# Patient Record
Sex: FEMALE | URBAN - NONMETROPOLITAN AREA
[De-identification: names, ages, dates, MRNs, and addresses within clinical notes are randomized per-mention and may not be internally consistent; named-entity substitution may affect disease eponyms.]

---

## 2018-02-21 ENCOUNTER — APPOINTMENT (OUTPATIENT)
Age: 58
Setting detail: DERMATOLOGY
End: 2018-02-21

## 2018-02-21 DIAGNOSIS — L40.0 PSORIASIS VULGARIS: ICD-10-CM

## 2018-02-21 DIAGNOSIS — L82.0 INFLAMED SEBORRHEIC KERATOSIS: ICD-10-CM

## 2018-02-21 DIAGNOSIS — L29.8 OTHER PRURITUS: ICD-10-CM

## 2018-02-21 PROBLEM — L30.9 DERMATITIS, UNSPECIFIED: Status: ACTIVE | Noted: 2018-02-21

## 2018-02-21 PROCEDURE — 99203 OFFICE O/P NEW LOW 30 MIN: CPT | Mod: 25

## 2018-02-21 PROCEDURE — OTHER COUNSELING: OTHER

## 2018-02-21 PROCEDURE — OTHER REASSURANCE: OTHER

## 2018-02-21 PROCEDURE — 11100: CPT

## 2018-02-21 PROCEDURE — 11101: CPT

## 2018-02-21 PROCEDURE — OTHER ORDER TESTS: OTHER

## 2018-02-21 PROCEDURE — OTHER PHOTOTHERAPY TREATMENT: OTHER

## 2018-02-21 PROCEDURE — 36415 COLL VENOUS BLD VENIPUNCTURE: CPT

## 2018-02-21 PROCEDURE — 96900 ACTINOTHERAPY UV LIGHT: CPT

## 2018-02-21 PROCEDURE — OTHER VENIPUNCTURE: OTHER

## 2018-02-21 PROCEDURE — OTHER PRESCRIPTION: OTHER

## 2018-02-21 PROCEDURE — OTHER BIOPSY BY PUNCH METHOD: OTHER

## 2018-02-21 RX ORDER — CALCIPOTRIENE 50 UG/G
OINTMENT TOPICAL
Qty: 1 | Refills: 1 | Status: ERX | COMMUNITY
Start: 2018-02-21

## 2018-02-21 RX ORDER — HYDROXYZINE HYDROCHLORIDE 25 MG/1
TABLET, FILM COATED ORAL
Qty: 30 | Refills: 1 | Status: ERX | COMMUNITY
Start: 2018-02-21

## 2018-02-21 RX ORDER — KETOCONAZOLE 20.5 MG/ML
SHAMPOO, SUSPENSION TOPICAL
Qty: 1 | Refills: 1 | Status: ERX | COMMUNITY
Start: 2018-02-21

## 2018-02-21 RX ORDER — TRIAMCINOLONE ACETONIDE 1 MG/G
CREAM TOPICAL
Qty: 1 | Refills: 1 | Status: ERX | COMMUNITY
Start: 2018-02-21

## 2018-02-21 RX ORDER — CLOBETASOL PROPIONATE 0.5 MG/ML
SOLUTION TOPICAL
Qty: 2 | Refills: 1 | Status: ERX | COMMUNITY
Start: 2018-02-21

## 2018-02-21 ASSESSMENT — LOCATION SIMPLE DESCRIPTION DERM
LOCATION SIMPLE: UPPER BACK
LOCATION SIMPLE: LEFT POSTERIOR THIGH
LOCATION SIMPLE: LEFT UPPER ARM
LOCATION SIMPLE: RIGHT ELBOW
LOCATION SIMPLE: RIGHT BUTTOCK
LOCATION SIMPLE: RIGHT POSTERIOR THIGH
LOCATION SIMPLE: LEFT BUTTOCK
LOCATION SIMPLE: CHEST
LOCATION SIMPLE: POSTERIOR SCALP
LOCATION SIMPLE: ABDOMEN
LOCATION SIMPLE: LEFT FOREARM
LOCATION SIMPLE: LEFT ELBOW
LOCATION SIMPLE: RIGHT FOREARM
LOCATION SIMPLE: RIGHT UPPER ARM

## 2018-02-21 ASSESSMENT — LOCATION DETAILED DESCRIPTION DERM
LOCATION DETAILED: LEFT RIB CAGE
LOCATION DETAILED: LEFT ELBOW
LOCATION DETAILED: RIGHT SUPERIOR OCCIPITAL SCALP
LOCATION DETAILED: INFERIOR THORACIC SPINE
LOCATION DETAILED: RIGHT RIB CAGE
LOCATION DETAILED: RIGHT ELBOW
LOCATION DETAILED: RIGHT PROXIMAL DORSAL FOREARM
LOCATION DETAILED: MID-OCCIPITAL SCALP
LOCATION DETAILED: RIGHT LATERAL ABDOMEN
LOCATION DETAILED: RIGHT VENTRAL DISTAL FOREARM
LOCATION DETAILED: LEFT ANTERIOR PROXIMAL UPPER ARM
LOCATION DETAILED: LEFT BUTTOCK
LOCATION DETAILED: LEFT PROXIMAL POSTERIOR THIGH
LOCATION DETAILED: RIGHT POSTERIOR PARIETAL SCALP
LOCATION DETAILED: RIGHT LATERAL SUPERIOR CHEST
LOCATION DETAILED: MIDDLE STERNUM
LOCATION DETAILED: LEFT SUPERIOR OCCIPITAL SCALP
LOCATION DETAILED: LEFT ANTECUBITAL SKIN
LOCATION DETAILED: RIGHT BUTTOCK
LOCATION DETAILED: LEFT LATERAL ABDOMEN
LOCATION DETAILED: LEFT LATERAL SUPERIOR CHEST
LOCATION DETAILED: RIGHT PROXIMAL POSTERIOR THIGH
LOCATION DETAILED: LEFT PROXIMAL DORSAL FOREARM
LOCATION DETAILED: RIGHT VENTRAL PROXIMAL FOREARM
LOCATION DETAILED: RIGHT ANTERIOR DISTAL UPPER ARM
LOCATION DETAILED: RIGHT MEDIAL BUTTOCK
LOCATION DETAILED: LEFT VENTRAL DISTAL FOREARM

## 2018-02-21 ASSESSMENT — LOCATION ZONE DERM
LOCATION ZONE: LEG
LOCATION ZONE: TRUNK
LOCATION ZONE: SCALP
LOCATION ZONE: ARM

## 2018-02-21 NOTE — PROCEDURE: BIOPSY BY PUNCH METHOD
Punch Size In Mm: 3
Bill [id number] For Specimen Handling/Conveyance To Laboratory?: no
Anesthesia Volume In Cc (Will Not Render If 0): 0.5
X Depth Of Punch In Cm (Optional): 0
Post-Care Instructions: I reviewed with the patient in detail post-care instructions. Patient is to keep the biopsy site dry overnight, and then wash with hydrogen peroxide 50/50 mix or antibacterial soap and water twice daily and apply bacitracin twice daily healed. Patient may apply hydrogen peroxide soaks to remove any crusting.
Hemostasis: None
Anesthesia Type: 1% lidocaine with epinephrine and a 1:10 solution of 8.4% sodium bicarbonate
Biopsy Type: H and E
Detail Level: Detailed
Epidermal Sutures: 4-0 Nylon
Wound Care: Bacitracin
Suture Removal: 14 days
Consent: Written consent was obtained and risks were reviewed including but not limited to scarring, infection, bleeding, scabbing, incomplete removal, nerve damage and allergy to anesthesia.
Dressing: bandage
Home Suture Removal Text: Patient was provided a home suture removal kit and will remove their sutures at home.  If they have any questions or difficulties they will call the office.
Notification Instructions: Patient will be notified of biopsy results. However, patient instructed to call the office if not contacted within 2 weeks.
Billing Type: Third-Party Bill

## 2018-02-21 NOTE — PROCEDURE: VENIPUNCTURE
Number Of Tubes Drawn: 6
Detail Level: None
Venipuncture Paragraph: An alcohol pad was applied to the venipuncture site. Venipuncture was performed using a butterfly needle. Pressure and a bandaid was applied to the site. No complications were noted.

## 2018-02-21 NOTE — PROCEDURE: PHOTOTHERAPY TREATMENT
Consent: Written consent obtained.  The risks were reviewed with the patient including but not limited to: burn, pigmentary changes, pain, blistering, scabbing, redness, increased risk of skin cancers, and the remote possibility of scarring.
Protocol For Photochemotherapy: Petrolatum And Nbuvb: The patient received Photochemotherapy: Petrolatum and NBUVB (petrolatum applied to all lesions prior to phototherapy).
Protocol For Protocol For Photochemotherapy For Severe Photoresponsive Dermatoses: Bath Puva: The patient received Photochemotherapy for severe photoresponsive dermatoses: Bath PUVA requiring at least 4 to 8 hours of care under direct physician supervision.
Protocol For Photochemotherapy For Severe Photoresponsive Dermatoses: Puva: The patient received Photochemotherapy for severe photoresponsive dermatoses: PUVA requiring at least 4 to 8 hours of care under direct physician supervision.
Protocol: NBUVB
Protocol For Bath Puva: The patient received Bath PUVA.
Protocol For Photochemotherapy For Severe Photoresponsive Dermatoses: Petrolatum And Broad Band Uvb: The patient received Photochemotherapyfor severe photoresponsive dermatoses: Petrolatum and Broad Band UVB requiring at least 4 to 8 hours of care under direct physician supervision.
Detail Level: Zone
Protocol For Uva1: The patient received UVA1.
Protocol For Photochemotherapy For Severe Photoresponsive Dermatoses: Tar And Nbuvb (Goeckerman Treatment): The patient received Photochemotherapy for severe photoresponsive dermatoses: Tar and NBUVB (Goeckerman treatment) requiring at least 4 to 8 hours of care under direct physician supervision.
Protocol For Uva: The patient received UVA.
Treatment Number: 1
Protocol For Nbuvb: The patient received NBUVB.
Protocol For Photochemotherapy: Tar And Broad Band Uvb (Goeckerman Treatment): The patient received Photochemotherapy: Tar and Broad Band UVB (Goeckerman treatment).
Post-Care Instructions: I reviewed with the patient in detail post-care instructions. Patient is to wear sun protection. Patients may expect sunburn like redness, discomfort and scabbing.
Protocol For Photochemotherapy: Mineral Oil And Nbuvb: The patient received Photochemotherapy: Mineral Oil and NBUVB (mineral oil applied to all lesions prior to phototherapy).
Protocol For Photochemotherapy For Severe Photoresponsive Dermatoses: Petrolatum And Nbuvb: The patient received Photochemotherapy for severe photoresponsive dermatoses: Petrolatum and NBUVB requiring at least 4 to 8 hours of care under direct physician supervision.
Protocol For Puva: The patient received PUVA.
Protocol For Photochemotherapy: Tar And Nbuvb (Goeckerman Treatment): The patient received Photochemotherapy: Tar and NBUVB (Goeckerman treatment).
Protocol For Photochemotherapy: Mineral Oil And Broad Band Uvb: The patient received Photochemotherapy: Mineral Oil and Broad Band UVB.
Protocol For Broad Band Uvb: The patient received Broad Band UVB.
Protocol For Nb Uva: The patient received NB UVA.
Protocol For Photochemotherapy: Triamcinolone Ointment And Nbuvb: The patient received Photochemotherapy: Triamcinolone and NBUVB (triamcinolone ointment applied to all lesions prior to phototherapy).
Protocol For Photochemotherapy For Severe Photoresponsive Dermatoses: Tar And Broad Band Uvb (Goeckerman Treatment): The patient received Photochemotherapy for severe photoresponsive dermatoses: Tar and Broad Band UVB (Goeckerman treatment) requiring at least 4 to 8 hours of care under direct physician supervision.
Protocol For Photochemotherapy: Petrolatum And Broad Band Uvb: The patient received Photochemotherapy: Petrolatum and Broad Band UVB.
Protocol For Photochemotherapy: Baby Oil And Nbuvb: The patient received Photochemotherapy: Baby Oil and NBUVB (baby oil applied to all lesions prior to phototherapy).

## 2018-02-23 ENCOUNTER — APPOINTMENT (OUTPATIENT)
Age: 58
Setting detail: DERMATOLOGY
End: 2018-02-23

## 2018-02-23 DIAGNOSIS — L40.0 PSORIASIS VULGARIS: ICD-10-CM

## 2018-02-23 PROCEDURE — OTHER PHOTOTHERAPY TREATMENT: OTHER

## 2018-02-23 PROCEDURE — 96910 PHOTCHMTX TAR&UVB/PTRLTM&UVB: CPT

## 2018-02-23 ASSESSMENT — LOCATION DETAILED DESCRIPTION DERM: LOCATION DETAILED: LEFT MEDIAL UPPER BACK

## 2018-02-23 ASSESSMENT — LOCATION ZONE DERM: LOCATION ZONE: TRUNK

## 2018-02-23 ASSESSMENT — LOCATION SIMPLE DESCRIPTION DERM: LOCATION SIMPLE: LEFT UPPER BACK

## 2018-02-23 NOTE — PROCEDURE: PHOTOTHERAPY TREATMENT
Protocol For Photochemotherapy For Severe Photoresponsive Dermatoses: Tar And Nbuvb (Goeckerman Treatment): The patient received Photochemotherapy for severe photoresponsive dermatoses: Tar and NBUVB (Goeckerman treatment) requiring at least 4 to 8 hours of care under direct physician supervision.
Protocol For Photochemotherapy For Severe Photoresponsive Dermatoses: Petrolatum And Broad Band Uvb: The patient received Photochemotherapyfor severe photoresponsive dermatoses: Petrolatum and Broad Band UVB requiring at least 4 to 8 hours of care under direct physician supervision.
Protocol For Photochemotherapy: Petrolatum And Broad Band Uvb: The patient received Photochemotherapy: Petrolatum and Broad Band UVB.
Protocol For Bath Puva: The patient received Bath PUVA.
Protocol For Photochemotherapy For Severe Photoresponsive Dermatoses: Petrolatum And Nbuvb: The patient received Photochemotherapy for severe photoresponsive dermatoses: Petrolatum and NBUVB requiring at least 4 to 8 hours of care under direct physician supervision.
Protocol For Photochemotherapy: Baby Oil And Nbuvb: The patient received Photochemotherapy: Baby Oil and NBUVB (baby oil applied to all lesions prior to phototherapy).
Protocol For Photochemotherapy: Tar And Broad Band Uvb (Goeckerman Treatment): The patient received Photochemotherapy: Tar and Broad Band UVB (Goeckerman treatment).
Protocol For Nbuvb: The patient received NBUVB.
Protocol For Photochemotherapy: Tar And Nbuvb (Goeckerman Treatment): The patient received Photochemotherapy: Tar and NBUVB (Goeckerman treatment).
Protocol For Photochemotherapy For Severe Photoresponsive Dermatoses: Puva: The patient received Photochemotherapy for severe photoresponsive dermatoses: PUVA requiring at least 4 to 8 hours of care under direct physician supervision.
Protocol For Uva1: The patient received UVA1.
Protocol For Nb Uva: The patient received NB UVA.
Protocol: Photochemotherapy: Petrolatum and NBUVB
Protocol For Photochemotherapy: Triamcinolone Ointment And Nbuvb: The patient received Photochemotherapy: Triamcinolone and NBUVB (triamcinolone ointment applied to all lesions prior to phototherapy).
Changes In Treatment Protocol: Increase to 30
Detail Level: Generalized
Protocol For Photochemotherapy: Mineral Oil And Broad Band Uvb: The patient received Photochemotherapy: Mineral Oil and Broad Band UVB.
Protocol For Broad Band Uvb: The patient received Broad Band UVB.
Total Treatment Time: 30
Protocol For Protocol For Photochemotherapy For Severe Photoresponsive Dermatoses: Bath Puva: The patient received Photochemotherapy for severe photoresponsive dermatoses: Bath PUVA requiring at least 4 to 8 hours of care under direct physician supervision.
Protocol For Photochemotherapy: Mineral Oil And Nbuvb: The patient received Photochemotherapy: Mineral Oil and NBUVB (mineral oil applied to all lesions prior to phototherapy).
Protocol For Photochemotherapy For Severe Photoresponsive Dermatoses: Tar And Broad Band Uvb (Goeckerman Treatment): The patient received Photochemotherapy for severe photoresponsive dermatoses: Tar and Broad Band UVB (Goeckerman treatment) requiring at least 4 to 8 hours of care under direct physician supervision.
Protocol For Photochemotherapy: Petrolatum And Nbuvb: The patient received Photochemotherapy: Petrolatum and NBUVB (petrolatum applied to all lesions prior to phototherapy).
Treatment Number: 2
Consent: Written consent obtained.  The risks were reviewed with the patient including but not limited to: burn, pigmentary changes, pain, blistering, scabbing, redness, increased risk of skin cancers, and the remote possibility of scarring.
Comments On Previous Treatment: No burning, soreness, redness, stiffness
Protocol For Uva: The patient received UVA.
Post-Care Instructions: I reviewed with the patient in detail post-care instructions. Patient is to wear sun protection. Patients may expect sunburn like redness, discomfort and scabbing.
Protocol For Puva: The patient received PUVA.
Name Of Supervising Technician: Ally guerra

## 2018-02-26 ENCOUNTER — APPOINTMENT (OUTPATIENT)
Age: 58
Setting detail: DERMATOLOGY
End: 2018-02-26

## 2018-02-26 DIAGNOSIS — L40.0 PSORIASIS VULGARIS: ICD-10-CM

## 2018-02-26 PROCEDURE — OTHER PHOTOTHERAPY TREATMENT: OTHER

## 2018-02-26 PROCEDURE — 96910 PHOTCHMTX TAR&UVB/PTRLTM&UVB: CPT

## 2018-02-26 ASSESSMENT — LOCATION SIMPLE DESCRIPTION DERM: LOCATION SIMPLE: LEFT UPPER BACK

## 2018-02-26 ASSESSMENT — LOCATION DETAILED DESCRIPTION DERM: LOCATION DETAILED: LEFT MEDIAL UPPER BACK

## 2018-02-26 ASSESSMENT — LOCATION ZONE DERM: LOCATION ZONE: TRUNK

## 2018-02-26 NOTE — PROCEDURE: PHOTOTHERAPY TREATMENT
Protocol For Nb Uva: The patient received NB UVA.
Post-Care Instructions: I reviewed with the patient in detail post-care instructions. Patient is to wear sun protection. Patients may expect sunburn like redness, discomfort and scabbing.
Protocol For Photochemotherapy: Tar And Nbuvb (Goeckerman Treatment): The patient received Photochemotherapy: Tar and NBUVB (Goeckerman treatment).
Skin Type: II
Protocol For Photochemotherapy: Petrolatum And Broad Band Uvb: The patient received Photochemotherapy: Petrolatum and Broad Band UVB.
Protocol For Photochemotherapy: Petrolatum And Nbuvb: The patient received Photochemotherapy: Petrolatum and NBUVB (petrolatum applied to all lesions prior to phototherapy).
Protocol For Photochemotherapy: Mineral Oil And Nbuvb: The patient received Photochemotherapy: Mineral Oil and NBUVB (mineral oil applied to all lesions prior to phototherapy).
Protocol For Photochemotherapy For Severe Photoresponsive Dermatoses: Tar And Broad Band Uvb (Goeckerman Treatment): The patient received Photochemotherapy for severe photoresponsive dermatoses: Tar and Broad Band UVB (Goeckerman treatment) requiring at least 4 to 8 hours of care under direct physician supervision.
Protocol For Photochemotherapy: Mineral Oil And Broad Band Uvb: The patient received Photochemotherapy: Mineral Oil and Broad Band UVB.
Protocol For Photochemotherapy For Severe Photoresponsive Dermatoses: Petrolatum And Broad Band Uvb: The patient received Photochemotherapyfor severe photoresponsive dermatoses: Petrolatum and Broad Band UVB requiring at least 4 to 8 hours of care under direct physician supervision.
Protocol For Uva: The patient received UVA.
Protocol For Nbuvb: The patient received NBUVB.
Protocol For Protocol For Photochemotherapy For Severe Photoresponsive Dermatoses: Bath Puva: The patient received Photochemotherapy for severe photoresponsive dermatoses: Bath PUVA requiring at least 4 to 8 hours of care under direct physician supervision.
Treatment Number: 3
Protocol For Puva: The patient received PUVA.
Protocol For Photochemotherapy For Severe Photoresponsive Dermatoses: Puva: The patient received Photochemotherapy for severe photoresponsive dermatoses: PUVA requiring at least 4 to 8 hours of care under direct physician supervision.
Protocol For Bath Puva: The patient received Bath PUVA.
Protocol For Broad Band Uvb: The patient received Broad Band UVB.
Protocol For Photochemotherapy: Tar And Broad Band Uvb (Goeckerman Treatment): The patient received Photochemotherapy: Tar and Broad Band UVB (Goeckerman treatment).
Name Of Supervising Technician: Kavitha Cortez
Protocol For Uva1: The patient received UVA1.
Consent: Written consent obtained.  The risks were reviewed with the patient including but not limited to: burn, pigmentary changes, pain, blistering, scabbing, redness, increased risk of skin cancers, and the remote possibility of scarring.
Protocol For Photochemotherapy: Baby Oil And Nbuvb: The patient received Photochemotherapy: Baby Oil and NBUVB (baby oil applied to all lesions prior to phototherapy).
Changes In Treatment Protocol: Increase to 45
Protocol: Photochemotherapy: Petrolatum and NBUVB
Comments On Previous Treatment: No burning, soreness, redness, stiffness
Protocol For Photochemotherapy For Severe Photoresponsive Dermatoses: Tar And Nbuvb (Goeckerman Treatment): The patient received Photochemotherapy for severe photoresponsive dermatoses: Tar and NBUVB (Goeckerman treatment) requiring at least 4 to 8 hours of care under direct physician supervision.
Protocol For Photochemotherapy: Triamcinolone Ointment And Nbuvb: The patient received Photochemotherapy: Triamcinolone and NBUVB (triamcinolone ointment applied to all lesions prior to phototherapy).
Detail Level: Generalized
Total Treatment Time: .45
Protocol For Photochemotherapy For Severe Photoresponsive Dermatoses: Petrolatum And Nbuvb: The patient received Photochemotherapy for severe photoresponsive dermatoses: Petrolatum and NBUVB requiring at least 4 to 8 hours of care under direct physician supervision.

## 2018-02-28 ENCOUNTER — APPOINTMENT (OUTPATIENT)
Age: 58
Setting detail: DERMATOLOGY
End: 2018-02-28

## 2018-02-28 DIAGNOSIS — L40.0 PSORIASIS VULGARIS: ICD-10-CM

## 2018-02-28 PROCEDURE — OTHER PHOTOTHERAPY TREATMENT: OTHER

## 2018-02-28 PROCEDURE — 96910 PHOTCHMTX TAR&UVB/PTRLTM&UVB: CPT

## 2018-02-28 ASSESSMENT — LOCATION SIMPLE DESCRIPTION DERM: LOCATION SIMPLE: LEFT UPPER BACK

## 2018-02-28 ASSESSMENT — LOCATION DETAILED DESCRIPTION DERM: LOCATION DETAILED: LEFT MEDIAL UPPER BACK

## 2018-02-28 ASSESSMENT — LOCATION ZONE DERM: LOCATION ZONE: TRUNK

## 2018-02-28 NOTE — PROCEDURE: PHOTOTHERAPY TREATMENT
Detail Level: Generalized
Protocol: Photochemotherapy: Petrolatum and NBUVB
Protocol For Photochemotherapy: Mineral Oil And Broad Band Uvb: The patient received Photochemotherapy: Mineral Oil and Broad Band UVB.
Protocol For Photochemotherapy: Tar And Broad Band Uvb (Goeckerman Treatment): The patient received Photochemotherapy: Tar and Broad Band UVB (Goeckerman treatment).
Comments On Previous Treatment: No burning, soreness, redness, stiffness
Protocol For Photochemotherapy: Petrolatum And Broad Band Uvb: The patient received Photochemotherapy: Petrolatum and Broad Band UVB.
Protocol For Photochemotherapy: Tar And Nbuvb (Goeckerman Treatment): The patient received Photochemotherapy: Tar and NBUVB (Goeckerman treatment).
Name Of Supervising Technician: Kavitha Cortez
Total Body Time: 1:00
Treatment Number: 4
Protocol For Puva: The patient received PUVA.
Post-Care Instructions: I reviewed with the patient in detail post-care instructions. Patient is to wear sun protection. Patients may expect sunburn like redness, discomfort and scabbing.
Protocol For Protocol For Photochemotherapy For Severe Photoresponsive Dermatoses: Bath Puva: The patient received Photochemotherapy for severe photoresponsive dermatoses: Bath PUVA requiring at least 4 to 8 hours of care under direct physician supervision.
Protocol For Photochemotherapy For Severe Photoresponsive Dermatoses: Petrolatum And Broad Band Uvb: The patient received Photochemotherapyfor severe photoresponsive dermatoses: Petrolatum and Broad Band UVB requiring at least 4 to 8 hours of care under direct physician supervision.
Protocol For Bath Puva: The patient received Bath PUVA.
Protocol For Photochemotherapy For Severe Photoresponsive Dermatoses: Puva: The patient received Photochemotherapy for severe photoresponsive dermatoses: PUVA requiring at least 4 to 8 hours of care under direct physician supervision.
Changes In Treatment Protocol: Increase to 1:00
Protocol For Photochemotherapy For Severe Photoresponsive Dermatoses: Tar And Nbuvb (Goeckerman Treatment): The patient received Photochemotherapy for severe photoresponsive dermatoses: Tar and NBUVB (Goeckerman treatment) requiring at least 4 to 8 hours of care under direct physician supervision.
Protocol For Photochemotherapy: Triamcinolone Ointment And Nbuvb: The patient received Photochemotherapy: Triamcinolone and NBUVB (triamcinolone ointment applied to all lesions prior to phototherapy).
Protocol For Nbuvb: The patient received NBUVB.
Skin Type: II
Protocol For Photochemotherapy For Severe Photoresponsive Dermatoses: Petrolatum And Nbuvb: The patient received Photochemotherapy for severe photoresponsive dermatoses: Petrolatum and NBUVB requiring at least 4 to 8 hours of care under direct physician supervision.
Protocol For Broad Band Uvb: The patient received Broad Band UVB.
Protocol For Uva: The patient received UVA.
Consent: Written consent obtained.  The risks were reviewed with the patient including but not limited to: burn, pigmentary changes, pain, blistering, scabbing, redness, increased risk of skin cancers, and the remote possibility of scarring.
Protocol For Photochemotherapy: Petrolatum And Nbuvb: The patient received Photochemotherapy: Petrolatum and NBUVB (petrolatum applied to all lesions prior to phototherapy).
Protocol For Nb Uva: The patient received NB UVA.
Protocol For Photochemotherapy For Severe Photoresponsive Dermatoses: Tar And Broad Band Uvb (Goeckerman Treatment): The patient received Photochemotherapy for severe photoresponsive dermatoses: Tar and Broad Band UVB (Goeckerman treatment) requiring at least 4 to 8 hours of care under direct physician supervision.
Protocol For Uva1: The patient received UVA1.
Protocol For Photochemotherapy: Baby Oil And Nbuvb: The patient received Photochemotherapy: Baby Oil and NBUVB (baby oil applied to all lesions prior to phototherapy).
Protocol For Photochemotherapy: Mineral Oil And Nbuvb: The patient received Photochemotherapy: Mineral Oil and NBUVB (mineral oil applied to all lesions prior to phototherapy).

## 2018-03-02 ENCOUNTER — APPOINTMENT (OUTPATIENT)
Age: 58
Setting detail: DERMATOLOGY
End: 2018-03-02

## 2018-03-02 DIAGNOSIS — L40.0 PSORIASIS VULGARIS: ICD-10-CM

## 2018-03-02 PROCEDURE — OTHER PHOTOTHERAPY TREATMENT: OTHER

## 2018-03-02 PROCEDURE — 96910 PHOTCHMTX TAR&UVB/PTRLTM&UVB: CPT

## 2018-03-02 ASSESSMENT — LOCATION SIMPLE DESCRIPTION DERM: LOCATION SIMPLE: LEFT UPPER BACK

## 2018-03-02 ASSESSMENT — LOCATION ZONE DERM: LOCATION ZONE: TRUNK

## 2018-03-02 ASSESSMENT — LOCATION DETAILED DESCRIPTION DERM: LOCATION DETAILED: LEFT MEDIAL UPPER BACK

## 2018-03-02 NOTE — PROCEDURE: PHOTOTHERAPY TREATMENT
Protocol For Nb Uva: The patient received NB UVA.
Consent: Written consent obtained.  The risks were reviewed with the patient including but not limited to: burn, pigmentary changes, pain, blistering, scabbing, redness, increased risk of skin cancers, and the remote possibility of scarring.
Protocol For Photochemotherapy For Severe Photoresponsive Dermatoses: Puva: The patient received Photochemotherapy for severe photoresponsive dermatoses: PUVA requiring at least 4 to 8 hours of care under direct physician supervision.
Protocol For Photochemotherapy: Tar And Nbuvb (Goeckerman Treatment): The patient received Photochemotherapy: Tar and NBUVB (Goeckerman treatment).
Protocol For Photochemotherapy For Severe Photoresponsive Dermatoses: Petrolatum And Nbuvb: The patient received Photochemotherapy for severe photoresponsive dermatoses: Petrolatum and NBUVB requiring at least 4 to 8 hours of care under direct physician supervision.
Comments On Previous Treatment: No burning, soreness, redness, stiffness
Skin Type: II
Changes In Treatment Protocol: Increase to 1:15
Protocol For Protocol For Photochemotherapy For Severe Photoresponsive Dermatoses: Bath Puva: The patient received Photochemotherapy for severe photoresponsive dermatoses: Bath PUVA requiring at least 4 to 8 hours of care under direct physician supervision.
Protocol For Bath Puva: The patient received Bath PUVA.
Protocol For Photochemotherapy For Severe Photoresponsive Dermatoses: Tar And Broad Band Uvb (Goeckerman Treatment): The patient received Photochemotherapy for severe photoresponsive dermatoses: Tar and Broad Band UVB (Goeckerman treatment) requiring at least 4 to 8 hours of care under direct physician supervision.
Protocol: Photochemotherapy: Petrolatum and NBUVB
Protocol For Photochemotherapy: Baby Oil And Nbuvb: The patient received Photochemotherapy: Baby Oil and NBUVB (baby oil applied to all lesions prior to phototherapy).
Total Body Time: 1:15
Post-Care Instructions: I reviewed with the patient in detail post-care instructions. Patient is to wear sun protection. Patients may expect sunburn like redness, discomfort and scabbing.
Protocol For Photochemotherapy: Triamcinolone Ointment And Nbuvb: The patient received Photochemotherapy: Triamcinolone and NBUVB (triamcinolone ointment applied to all lesions prior to phototherapy).
Protocol For Photochemotherapy For Severe Photoresponsive Dermatoses: Petrolatum And Broad Band Uvb: The patient received Photochemotherapyfor severe photoresponsive dermatoses: Petrolatum and Broad Band UVB requiring at least 4 to 8 hours of care under direct physician supervision.
Protocol For Uva1: The patient received UVA1.
Treatment Number: 5
Protocol For Photochemotherapy: Petrolatum And Nbuvb: The patient received Photochemotherapy: Petrolatum and NBUVB (petrolatum applied to all lesions prior to phototherapy).
Name Of Supervising Technician: Kavitha Cortez
Protocol For Photochemotherapy: Tar And Broad Band Uvb (Goeckerman Treatment): The patient received Photochemotherapy: Tar and Broad Band UVB (Goeckerman treatment).
Protocol For Puva: The patient received PUVA.
Protocol For Uva: The patient received UVA.
Protocol For Photochemotherapy: Petrolatum And Broad Band Uvb: The patient received Photochemotherapy: Petrolatum and Broad Band UVB.
Detail Level: Generalized
Protocol For Photochemotherapy: Mineral Oil And Nbuvb: The patient received Photochemotherapy: Mineral Oil and NBUVB (mineral oil applied to all lesions prior to phototherapy).
Protocol For Photochemotherapy For Severe Photoresponsive Dermatoses: Tar And Nbuvb (Goeckerman Treatment): The patient received Photochemotherapy for severe photoresponsive dermatoses: Tar and NBUVB (Goeckerman treatment) requiring at least 4 to 8 hours of care under direct physician supervision.
Protocol For Nbuvb: The patient received NBUVB.
Protocol For Photochemotherapy: Mineral Oil And Broad Band Uvb: The patient received Photochemotherapy: Mineral Oil and Broad Band UVB.
Protocol For Broad Band Uvb: The patient received Broad Band UVB.

## 2018-03-05 ENCOUNTER — APPOINTMENT (OUTPATIENT)
Age: 58
Setting detail: DERMATOLOGY
End: 2018-03-05

## 2018-03-05 DIAGNOSIS — L40.0 PSORIASIS VULGARIS: ICD-10-CM

## 2018-03-05 PROCEDURE — OTHER PHOTOTHERAPY TREATMENT: OTHER

## 2018-03-05 PROCEDURE — 96910 PHOTCHMTX TAR&UVB/PTRLTM&UVB: CPT

## 2018-03-05 ASSESSMENT — LOCATION DETAILED DESCRIPTION DERM: LOCATION DETAILED: LEFT MEDIAL UPPER BACK

## 2018-03-05 ASSESSMENT — LOCATION ZONE DERM: LOCATION ZONE: TRUNK

## 2018-03-05 ASSESSMENT — LOCATION SIMPLE DESCRIPTION DERM: LOCATION SIMPLE: LEFT UPPER BACK

## 2018-03-05 NOTE — PROCEDURE: PHOTOTHERAPY TREATMENT
Protocol For Photochemotherapy: Tar And Nbuvb (Goeckerman Treatment): The patient received Photochemotherapy: Tar and NBUVB (Goeckerman treatment).
Protocol For Photochemotherapy For Severe Photoresponsive Dermatoses: Petrolatum And Broad Band Uvb: The patient received Photochemotherapyfor severe photoresponsive dermatoses: Petrolatum and Broad Band UVB requiring at least 4 to 8 hours of care under direct physician supervision.
Protocol For Uva: The patient received UVA.
Protocol For Photochemotherapy For Severe Photoresponsive Dermatoses: Puva: The patient received Photochemotherapy for severe photoresponsive dermatoses: PUVA requiring at least 4 to 8 hours of care under direct physician supervision.
Protocol For Photochemotherapy: Triamcinolone Ointment And Nbuvb: The patient received Photochemotherapy: Triamcinolone and NBUVB (triamcinolone ointment applied to all lesions prior to phototherapy).
Post-Care Instructions: I reviewed with the patient in detail post-care instructions. Patient is to wear sun protection. Patients may expect sunburn like redness, discomfort and scabbing.
Protocol For Photochemotherapy For Severe Photoresponsive Dermatoses: Tar And Nbuvb (Goeckerman Treatment): The patient received Photochemotherapy for severe photoresponsive dermatoses: Tar and NBUVB (Goeckerman treatment) requiring at least 4 to 8 hours of care under direct physician supervision.
Protocol For Puva: The patient received PUVA.
Protocol For Photochemotherapy For Severe Photoresponsive Dermatoses: Petrolatum And Nbuvb: The patient received Photochemotherapy for severe photoresponsive dermatoses: Petrolatum and NBUVB requiring at least 4 to 8 hours of care under direct physician supervision.
Protocol For Broad Band Uvb: The patient received Broad Band UVB.
Protocol For Bath Puva: The patient received Bath PUVA.
Name Of Supervising Technician: Kavitha Cortez
Total Treatment Time: 1:15
Comments On Previous Treatment: No burning, soreness, redness, stiffness
Protocol For Photochemotherapy: Petrolatum And Nbuvb: The patient received Photochemotherapy: Petrolatum and NBUVB (petrolatum applied to all lesions prior to phototherapy).
Skin Type: II
Protocol For Uva1: The patient received UVA1.
Protocol For Photochemotherapy: Mineral Oil And Nbuvb: The patient received Photochemotherapy: Mineral Oil and NBUVB (mineral oil applied to all lesions prior to phototherapy).
Protocol For Protocol For Photochemotherapy For Severe Photoresponsive Dermatoses: Bath Puva: The patient received Photochemotherapy for severe photoresponsive dermatoses: Bath PUVA requiring at least 4 to 8 hours of care under direct physician supervision.
Protocol For Photochemotherapy For Severe Photoresponsive Dermatoses: Tar And Broad Band Uvb (Goeckerman Treatment): The patient received Photochemotherapy for severe photoresponsive dermatoses: Tar and Broad Band UVB (Goeckerman treatment) requiring at least 4 to 8 hours of care under direct physician supervision.
Consent: Written consent obtained.  The risks were reviewed with the patient including but not limited to: burn, pigmentary changes, pain, blistering, scabbing, redness, increased risk of skin cancers, and the remote possibility of scarring.
Protocol: Photochemotherapy: Petrolatum and NBUVB
Total Body Time: 1:30
Protocol For Photochemotherapy: Mineral Oil And Broad Band Uvb: The patient received Photochemotherapy: Mineral Oil and Broad Band UVB.
Protocol For Nb Uva: The patient received NB UVA.
Protocol For Photochemotherapy: Petrolatum And Broad Band Uvb: The patient received Photochemotherapy: Petrolatum and Broad Band UVB.
Changes In Treatment Protocol: Increase to 1:30
Protocol For Nbuvb: The patient received NBUVB.
Detail Level: Generalized
Treatment Number: 6
Protocol For Photochemotherapy: Baby Oil And Nbuvb: The patient received Photochemotherapy: Baby Oil and NBUVB (baby oil applied to all lesions prior to phototherapy).
Protocol For Photochemotherapy: Tar And Broad Band Uvb (Goeckerman Treatment): The patient received Photochemotherapy: Tar and Broad Band UVB (Goeckerman treatment).

## 2018-03-07 ENCOUNTER — APPOINTMENT (OUTPATIENT)
Age: 58
Setting detail: DERMATOLOGY
End: 2018-03-07

## 2018-03-07 DIAGNOSIS — Z48.02 ENCOUNTER FOR REMOVAL OF SUTURES: ICD-10-CM

## 2018-03-07 DIAGNOSIS — L40.0 PSORIASIS VULGARIS: ICD-10-CM

## 2018-03-07 PROCEDURE — 99213 OFFICE O/P EST LOW 20 MIN: CPT | Mod: 25

## 2018-03-07 PROCEDURE — OTHER PHOTOTHERAPY TREATMENT: OTHER

## 2018-03-07 PROCEDURE — OTHER ADDITIONAL NOTES: OTHER

## 2018-03-07 PROCEDURE — OTHER MIPS QUALITY: OTHER

## 2018-03-07 PROCEDURE — 96910 PHOTCHMTX TAR&UVB/PTRLTM&UVB: CPT

## 2018-03-07 PROCEDURE — OTHER SUTURE REMOVAL (NO GLOBAL PERIOD): OTHER

## 2018-03-07 PROCEDURE — OTHER HUMIRA INITIATION: OTHER

## 2018-03-07 ASSESSMENT — LOCATION SIMPLE DESCRIPTION DERM
LOCATION SIMPLE: ABDOMEN
LOCATION SIMPLE: LEFT UPPER ARM
LOCATION SIMPLE: POSTERIOR SCALP

## 2018-03-07 ASSESSMENT — LOCATION ZONE DERM
LOCATION ZONE: ARM
LOCATION ZONE: TRUNK
LOCATION ZONE: SCALP

## 2018-03-07 ASSESSMENT — LOCATION DETAILED DESCRIPTION DERM
LOCATION DETAILED: LEFT ANTERIOR PROXIMAL UPPER ARM
LOCATION DETAILED: MID-OCCIPITAL SCALP
LOCATION DETAILED: RIGHT LATERAL ABDOMEN

## 2018-03-07 NOTE — PROCEDURE: PHOTOTHERAPY TREATMENT
Protocol For Photochemotherapy For Severe Photoresponsive Dermatoses: Tar And Broad Band Uvb (Goeckerman Treatment): The patient received Photochemotherapy for severe photoresponsive dermatoses: Tar and Broad Band UVB (Goeckerman treatment) requiring at least 4 to 8 hours of care under direct physician supervision.
Protocol For Uva1: The patient received UVA1.
Protocol For Photochemotherapy: Tar And Nbuvb (Goeckerman Treatment): The patient received Photochemotherapy: Tar and NBUVB (Goeckerman treatment).
Protocol For Uva: The patient received UVA.
Protocol: Photochemotherapy: Petrolatum and NBUVB
Treatment Number: 7
Protocol For Puva: The patient received PUVA.
Protocol For Photochemotherapy: Mineral Oil And Nbuvb: The patient received Photochemotherapy: Mineral Oil and NBUVB (mineral oil applied to all lesions prior to phototherapy).
Protocol For Photochemotherapy For Severe Photoresponsive Dermatoses: Petrolatum And Nbuvb: The patient received Photochemotherapy for severe photoresponsive dermatoses: Petrolatum and NBUVB requiring at least 4 to 8 hours of care under direct physician supervision.
Protocol For Photochemotherapy: Triamcinolone Ointment And Nbuvb: The patient received Photochemotherapy: Triamcinolone and NBUVB (triamcinolone ointment applied to all lesions prior to phototherapy).
Protocol For Bath Puva: The patient received Bath PUVA.
Protocol For Photochemotherapy: Petrolatum And Broad Band Uvb: The patient received Photochemotherapy: Petrolatum and Broad Band UVB.
Protocol For Photochemotherapy: Tar And Broad Band Uvb (Goeckerman Treatment): The patient received Photochemotherapy: Tar and Broad Band UVB (Goeckerman treatment).
Protocol For Photochemotherapy: Baby Oil And Nbuvb: The patient received Photochemotherapy: Baby Oil and NBUVB (baby oil applied to all lesions prior to phototherapy).
Protocol For Nb Uva: The patient received NB UVA.
Protocol For Photochemotherapy For Severe Photoresponsive Dermatoses: Puva: The patient received Photochemotherapy for severe photoresponsive dermatoses: PUVA requiring at least 4 to 8 hours of care under direct physician supervision.
Protocol For Broad Band Uvb: The patient received Broad Band UVB.
Protocol For Nbuvb: The patient received NBUVB.
Protocol For Photochemotherapy: Mineral Oil And Broad Band Uvb: The patient received Photochemotherapy: Mineral Oil and Broad Band UVB.
Detail Level: Zone
Total Body Time: 1:30
Consent: Written consent obtained.  The risks were reviewed with the patient including but not limited to: burn, pigmentary changes, pain, blistering, scabbing, redness, increased risk of skin cancers, and the remote possibility of scarring.
Protocol For Photochemotherapy: Petrolatum And Nbuvb: The patient received Photochemotherapy: Petrolatum and NBUVB (petrolatum applied to all lesions prior to phototherapy).
Post-Care Instructions: I reviewed with the patient in detail post-care instructions. Patient is to wear sun protection. Patients may expect sunburn like redness, discomfort and scabbing.
Protocol For Photochemotherapy For Severe Photoresponsive Dermatoses: Petrolatum And Broad Band Uvb: The patient received Photochemotherapyfor severe photoresponsive dermatoses: Petrolatum and Broad Band UVB requiring at least 4 to 8 hours of care under direct physician supervision.
Protocol For Photochemotherapy For Severe Photoresponsive Dermatoses: Tar And Nbuvb (Goeckerman Treatment): The patient received Photochemotherapy for severe photoresponsive dermatoses: Tar and NBUVB (Goeckerman treatment) requiring at least 4 to 8 hours of care under direct physician supervision.
Skin Type: II
Protocol For Protocol For Photochemotherapy For Severe Photoresponsive Dermatoses: Bath Puva: The patient received Photochemotherapy for severe photoresponsive dermatoses: Bath PUVA requiring at least 4 to 8 hours of care under direct physician supervision.

## 2018-03-07 NOTE — PROCEDURE: MIPS QUALITY
Quality 111:Pneumonia Vaccination Status For Older Adults: Pneumococcal Vaccination not Administered or Previously Received, Reason not Otherwise Specified
Detail Level: Detailed
Quality 402: Tobacco Use And Help With Quitting Among Adolescents: Patient screened for tobacco and is a smoker AND received Cessation Counseling
Quality 110: Preventive Care And Screening: Influenza Immunization: Influenza immunization was not ordered or administered, reason not given

## 2018-03-09 ENCOUNTER — APPOINTMENT (OUTPATIENT)
Age: 58
Setting detail: DERMATOLOGY
End: 2018-03-09

## 2018-03-09 DIAGNOSIS — L40.0 PSORIASIS VULGARIS: ICD-10-CM

## 2018-03-09 PROCEDURE — 96910 PHOTCHMTX TAR&UVB/PTRLTM&UVB: CPT

## 2018-03-09 PROCEDURE — OTHER PHOTOTHERAPY TREATMENT: OTHER

## 2018-03-09 NOTE — PROCEDURE: PHOTOTHERAPY TREATMENT
Total Body Time: 1:45
Protocol For Photochemotherapy: Baby Oil And Nbuvb: The patient received Photochemotherapy: Baby Oil and NBUVB (baby oil applied to all lesions prior to phototherapy).
Protocol For Uva: The patient received UVA.
Treatment Number: 8
Protocol For Photochemotherapy: Mineral Oil And Broad Band Uvb: The patient received Photochemotherapy: Mineral Oil and Broad Band UVB.
Detail Level: Zone
Protocol For Bath Puva: The patient received Bath PUVA.
Post-Care Instructions: I reviewed with the patient in detail post-care instructions. Patient is to wear sun protection. Patients may expect sunburn like redness, discomfort and scabbing.
Protocol For Nbuvb: The patient received NBUVB.
Protocol: Photochemotherapy: Petrolatum and NBUVB
Protocol For Photochemotherapy: Mineral Oil And Nbuvb: The patient received Photochemotherapy: Mineral Oil and NBUVB (mineral oil applied to all lesions prior to phototherapy).
Protocol For Photochemotherapy: Petrolatum And Nbuvb: The patient received Photochemotherapy: Petrolatum and NBUVB (petrolatum applied to all lesions prior to phototherapy).
Protocol For Photochemotherapy: Petrolatum And Broad Band Uvb: The patient received Photochemotherapy: Petrolatum and Broad Band UVB.
Protocol For Photochemotherapy For Severe Photoresponsive Dermatoses: Petrolatum And Nbuvb: The patient received Photochemotherapy for severe photoresponsive dermatoses: Petrolatum and NBUVB requiring at least 4 to 8 hours of care under direct physician supervision.
Protocol For Uva1: The patient received UVA1.
Protocol For Photochemotherapy: Triamcinolone Ointment And Nbuvb: The patient received Photochemotherapy: Triamcinolone and NBUVB (triamcinolone ointment applied to all lesions prior to phototherapy).
Protocol For Nb Uva: The patient received NB UVA.
Protocol For Protocol For Photochemotherapy For Severe Photoresponsive Dermatoses: Bath Puva: The patient received Photochemotherapy for severe photoresponsive dermatoses: Bath PUVA requiring at least 4 to 8 hours of care under direct physician supervision.
Protocol For Photochemotherapy For Severe Photoresponsive Dermatoses: Tar And Broad Band Uvb (Goeckerman Treatment): The patient received Photochemotherapy for severe photoresponsive dermatoses: Tar and Broad Band UVB (Goeckerman treatment) requiring at least 4 to 8 hours of care under direct physician supervision.
Protocol For Photochemotherapy For Severe Photoresponsive Dermatoses: Puva: The patient received Photochemotherapy for severe photoresponsive dermatoses: PUVA requiring at least 4 to 8 hours of care under direct physician supervision.
Protocol For Photochemotherapy For Severe Photoresponsive Dermatoses: Tar And Nbuvb (Goeckerman Treatment): The patient received Photochemotherapy for severe photoresponsive dermatoses: Tar and NBUVB (Goeckerman treatment) requiring at least 4 to 8 hours of care under direct physician supervision.
Skin Type: II
Name Of Supervising Technician: Kavitha Cortez
Consent: Written consent obtained.  The risks were reviewed with the patient including but not limited to: burn, pigmentary changes, pain, blistering, scabbing, redness, increased risk of skin cancers, and the remote possibility of scarring.
Protocol For Photochemotherapy: Tar And Nbuvb (Goeckerman Treatment): The patient received Photochemotherapy: Tar and NBUVB (Goeckerman treatment).
Protocol For Broad Band Uvb: The patient received Broad Band UVB.
Protocol For Puva: The patient received PUVA.
Changes In Treatment Protocol: Increased by 15sec.
Protocol For Photochemotherapy For Severe Photoresponsive Dermatoses: Petrolatum And Broad Band Uvb: The patient received Photochemotherapyfor severe photoresponsive dermatoses: Petrolatum and Broad Band UVB requiring at least 4 to 8 hours of care under direct physician supervision.
Protocol For Photochemotherapy: Tar And Broad Band Uvb (Goeckerman Treatment): The patient received Photochemotherapy: Tar and Broad Band UVB (Goeckerman treatment).

## 2018-03-14 ENCOUNTER — APPOINTMENT (OUTPATIENT)
Age: 58
Setting detail: DERMATOLOGY
End: 2020-01-21

## 2018-03-14 DIAGNOSIS — L40.0 PSORIASIS VULGARIS: ICD-10-CM

## 2018-03-14 PROCEDURE — OTHER PHOTOTHERAPY TREATMENT: OTHER

## 2018-03-14 PROCEDURE — 96910 PHOTCHMTX TAR&UVB/PTRLTM&UVB: CPT

## 2018-03-14 NOTE — PROCEDURE: PHOTOTHERAPY TREATMENT
Protocol For Protocol For Photochemotherapy For Severe Photoresponsive Dermatoses: Bath Puva: The patient received Photochemotherapy for severe photoresponsive dermatoses: Bath PUVA requiring at least 4 to 8 hours of care under direct physician supervision.
Protocol For Photochemotherapy: Tar And Nbuvb (Goeckerman Treatment): The patient received Photochemotherapy: Tar and NBUVB (Goeckerman treatment).
Protocol For Uva: The patient received UVA.
Protocol For Photochemotherapy For Severe Photoresponsive Dermatoses: Puva: The patient received Photochemotherapy for severe photoresponsive dermatoses: PUVA requiring at least 4 to 8 hours of care under direct physician supervision.
Protocol For Photochemotherapy: Baby Oil And Nbuvb: The patient received Photochemotherapy: Baby Oil and NBUVB (baby oil applied to all lesions prior to phototherapy).
Treatment Number: 9
Skin Type: II
Detail Level: Zone
Protocol For Photochemotherapy For Severe Photoresponsive Dermatoses: Petrolatum And Broad Band Uvb: The patient received Photochemotherapyfor severe photoresponsive dermatoses: Petrolatum and Broad Band UVB requiring at least 4 to 8 hours of care under direct physician supervision.
Post-Care Instructions: I reviewed with the patient in detail post-care instructions. Patient is to wear sun protection. Patients may expect sunburn like redness, discomfort and scabbing.
Protocol For Nb Uva: The patient received NB UVA.
Consent: Written consent obtained.  The risks were reviewed with the patient including but not limited to: burn, pigmentary changes, pain, blistering, scabbing, redness, increased risk of skin cancers, and the remote possibility of scarring.
Protocol For Broad Band Uvb: The patient received Broad Band UVB.
Protocol For Photochemotherapy For Severe Photoresponsive Dermatoses: Tar And Broad Band Uvb (Goeckerman Treatment): The patient received Photochemotherapy for severe photoresponsive dermatoses: Tar and Broad Band UVB (Goeckerman treatment) requiring at least 4 to 8 hours of care under direct physician supervision.
Protocol For Photochemotherapy: Mineral Oil And Broad Band Uvb: The patient received Photochemotherapy: Mineral Oil and Broad Band UVB.
Protocol For Photochemotherapy: Tar And Broad Band Uvb (Goeckerman Treatment): The patient received Photochemotherapy: Tar and Broad Band UVB (Goeckerman treatment).
Protocol For Bath Puva: The patient received Bath PUVA.
Protocol For Photochemotherapy: Petrolatum And Broad Band Uvb: The patient received Photochemotherapy: Petrolatum and Broad Band UVB.
Protocol For Photochemotherapy: Mineral Oil And Nbuvb: The patient received Photochemotherapy: Mineral Oil and NBUVB (mineral oil applied to all lesions prior to phototherapy).
Protocol For Photochemotherapy: Triamcinolone Ointment And Nbuvb: The patient received Photochemotherapy: Triamcinolone and NBUVB (triamcinolone ointment applied to all lesions prior to phototherapy).
Protocol For Photochemotherapy For Severe Photoresponsive Dermatoses: Petrolatum And Nbuvb: The patient received Photochemotherapy for severe photoresponsive dermatoses: Petrolatum and NBUVB requiring at least 4 to 8 hours of care under direct physician supervision.
Protocol For Uva1: The patient received UVA1.
Protocol For Puva: The patient received PUVA.
Total Body Time: 2:00
Protocol For Photochemotherapy For Severe Photoresponsive Dermatoses: Tar And Nbuvb (Goeckerman Treatment): The patient received Photochemotherapy for severe photoresponsive dermatoses: Tar and NBUVB (Goeckerman treatment) requiring at least 4 to 8 hours of care under direct physician supervision.
Protocol: Photochemotherapy: Petrolatum and NBUVB
Protocol For Nbuvb: The patient received NBUVB.
Protocol For Photochemotherapy: Petrolatum And Nbuvb: The patient received Photochemotherapy: Petrolatum and NBUVB (petrolatum applied to all lesions prior to phototherapy).

## 2018-04-02 ENCOUNTER — APPOINTMENT (OUTPATIENT)
Age: 58
Setting detail: DERMATOLOGY
End: 2018-04-02

## 2018-04-02 DIAGNOSIS — L40.0 PSORIASIS VULGARIS: ICD-10-CM

## 2018-04-02 PROCEDURE — OTHER PHOTOTHERAPY TREATMENT: OTHER

## 2018-04-02 PROCEDURE — 96910 PHOTCHMTX TAR&UVB/PTRLTM&UVB: CPT

## 2018-04-02 ASSESSMENT — LOCATION DETAILED DESCRIPTION DERM
LOCATION DETAILED: INFERIOR THORACIC SPINE
LOCATION DETAILED: PERIUMBILICAL SKIN

## 2018-04-02 ASSESSMENT — LOCATION ZONE DERM: LOCATION ZONE: TRUNK

## 2018-04-02 ASSESSMENT — LOCATION SIMPLE DESCRIPTION DERM
LOCATION SIMPLE: ABDOMEN
LOCATION SIMPLE: UPPER BACK

## 2018-04-02 NOTE — PROCEDURE: PHOTOTHERAPY TREATMENT
Protocol For Photochemotherapy: Baby Oil And Nbuvb: The patient received Photochemotherapy: Baby Oil and NBUVB (baby oil applied to all lesions prior to phototherapy).
Protocol For Photochemotherapy For Severe Photoresponsive Dermatoses: Petrolatum And Broad Band Uvb: The patient received Photochemotherapyfor severe photoresponsive dermatoses: Petrolatum and Broad Band UVB requiring at least 4 to 8 hours of care under direct physician supervision.
Protocol For Photochemotherapy For Severe Photoresponsive Dermatoses: Puva: The patient received Photochemotherapy for severe photoresponsive dermatoses: PUVA requiring at least 4 to 8 hours of care under direct physician supervision.
Skin Type: II
Protocol For Broad Band Uvb: The patient received Broad Band UVB.
Protocol For Uva: The patient received UVA.
Protocol For Photochemotherapy: Triamcinolone Ointment And Nbuvb: The patient received Photochemotherapy: Triamcinolone and NBUVB (triamcinolone ointment applied to all lesions prior to phototherapy).
Protocol For Photochemotherapy: Petrolatum And Nbuvb: The patient received Photochemotherapy: Petrolatum and NBUVB (petrolatum applied to all lesions prior to phototherapy).
Consent: Written consent obtained.  The risks were reviewed with the patient including but not limited to: burn, pigmentary changes, pain, blistering, scabbing, redness, increased risk of skin cancers, and the remote possibility of scarring.
Protocol For Protocol For Photochemotherapy For Severe Photoresponsive Dermatoses: Bath Puva: The patient received Photochemotherapy for severe photoresponsive dermatoses: Bath PUVA requiring at least 4 to 8 hours of care under direct physician supervision.
Treatment Number: 10
Protocol For Photochemotherapy: Tar And Broad Band Uvb (Goeckerman Treatment): The patient received Photochemotherapy: Tar and Broad Band UVB (Goeckerman treatment).
Protocol For Photochemotherapy For Severe Photoresponsive Dermatoses: Petrolatum And Nbuvb: The patient received Photochemotherapy for severe photoresponsive dermatoses: Petrolatum and NBUVB requiring at least 4 to 8 hours of care under direct physician supervision.
Detail Level: Zone
Protocol For Photochemotherapy: Mineral Oil And Nbuvb: The patient received Photochemotherapy: Mineral Oil and NBUVB (mineral oil applied to all lesions prior to phototherapy).
Protocol For Nb Uva: The patient received NB UVA.
Protocol For Photochemotherapy: Petrolatum And Broad Band Uvb: The patient received Photochemotherapy: Petrolatum and Broad Band UVB.
Protocol For Photochemotherapy For Severe Photoresponsive Dermatoses: Tar And Broad Band Uvb (Goeckerman Treatment): The patient received Photochemotherapy for severe photoresponsive dermatoses: Tar and Broad Band UVB (Goeckerman treatment) requiring at least 4 to 8 hours of care under direct physician supervision.
Protocol For Puva: The patient received PUVA.
Protocol For Bath Puva: The patient received Bath PUVA.
Post-Care Instructions: I reviewed with the patient in detail post-care instructions. Patient is to wear sun protection. Patients may expect sunburn like redness, discomfort and scabbing.
Protocol For Photochemotherapy: Mineral Oil And Broad Band Uvb: The patient received Photochemotherapy: Mineral Oil and Broad Band UVB.
Name Of Supervising Technician: Kavitha Cortez
Protocol: Photochemotherapy: Petrolatum and NBUVB
Protocol For Photochemotherapy For Severe Photoresponsive Dermatoses: Tar And Nbuvb (Goeckerman Treatment): The patient received Photochemotherapy for severe photoresponsive dermatoses: Tar and NBUVB (Goeckerman treatment) requiring at least 4 to 8 hours of care under direct physician supervision.
Changes In Treatment Protocol: Dropped to 1min due to missing for two weeks
Protocol For Uva1: The patient received UVA1.
Protocol For Photochemotherapy: Tar And Nbuvb (Goeckerman Treatment): The patient received Photochemotherapy: Tar and NBUVB (Goeckerman treatment).
Total Body Time: 1:00
Protocol For Nbuvb: The patient received NBUVB.

## 2018-04-04 ENCOUNTER — APPOINTMENT (OUTPATIENT)
Age: 58
Setting detail: DERMATOLOGY
End: 2018-04-04

## 2018-04-04 DIAGNOSIS — L40.0 PSORIASIS VULGARIS: ICD-10-CM

## 2018-04-04 PROCEDURE — OTHER PHOTOTHERAPY TREATMENT: OTHER

## 2018-04-04 PROCEDURE — 96910 PHOTCHMTX TAR&UVB/PTRLTM&UVB: CPT

## 2018-04-04 ASSESSMENT — LOCATION SIMPLE DESCRIPTION DERM
LOCATION SIMPLE: UPPER BACK
LOCATION SIMPLE: ABDOMEN

## 2018-04-04 ASSESSMENT — LOCATION ZONE DERM: LOCATION ZONE: TRUNK

## 2018-04-04 ASSESSMENT — LOCATION DETAILED DESCRIPTION DERM
LOCATION DETAILED: PERIUMBILICAL SKIN
LOCATION DETAILED: INFERIOR THORACIC SPINE

## 2018-04-04 NOTE — PROCEDURE: PHOTOTHERAPY TREATMENT
Total Body Time: 1:15
Protocol For Photochemotherapy For Severe Photoresponsive Dermatoses: Petrolatum And Nbuvb: The patient received Photochemotherapy for severe photoresponsive dermatoses: Petrolatum and NBUVB requiring at least 4 to 8 hours of care under direct physician supervision.
Protocol For Bath Puva: The patient received Bath PUVA.
Protocol For Photochemotherapy: Baby Oil And Nbuvb: The patient received Photochemotherapy: Baby Oil and NBUVB (baby oil applied to all lesions prior to phototherapy).
Changes In Treatment Protocol: Increased to 1:15
Treatment Number: 11
Protocol For Photochemotherapy: Petrolatum And Broad Band Uvb: The patient received Photochemotherapy: Petrolatum and Broad Band UVB.
Protocol For Photochemotherapy: Petrolatum And Nbuvb: The patient received Photochemotherapy: Petrolatum and NBUVB (petrolatum applied to all lesions prior to phototherapy).
Post-Care Instructions: I reviewed with the patient in detail post-care instructions. Patient is to wear sun protection. Patients may expect sunburn like redness, discomfort and scabbing.
Protocol For Protocol For Photochemotherapy For Severe Photoresponsive Dermatoses: Bath Puva: The patient received Photochemotherapy for severe photoresponsive dermatoses: Bath PUVA requiring at least 4 to 8 hours of care under direct physician supervision.
Protocol For Photochemotherapy For Severe Photoresponsive Dermatoses: Tar And Broad Band Uvb (Goeckerman Treatment): The patient received Photochemotherapy for severe photoresponsive dermatoses: Tar and Broad Band UVB (Goeckerman treatment) requiring at least 4 to 8 hours of care under direct physician supervision.
Skin Type: II
Protocol For Puva: The patient received PUVA.
Protocol For Photochemotherapy: Mineral Oil And Nbuvb: The patient received Photochemotherapy: Mineral Oil and NBUVB (mineral oil applied to all lesions prior to phototherapy).
Protocol For Photochemotherapy For Severe Photoresponsive Dermatoses: Puva: The patient received Photochemotherapy for severe photoresponsive dermatoses: PUVA requiring at least 4 to 8 hours of care under direct physician supervision.
Protocol For Nbuvb: The patient received NBUVB.
Protocol For Photochemotherapy: Tar And Nbuvb (Goeckerman Treatment): The patient received Photochemotherapy: Tar and NBUVB (Goeckerman treatment).
Protocol For Photochemotherapy For Severe Photoresponsive Dermatoses: Tar And Nbuvb (Goeckerman Treatment): The patient received Photochemotherapy for severe photoresponsive dermatoses: Tar and NBUVB (Goeckerman treatment) requiring at least 4 to 8 hours of care under direct physician supervision.
Protocol For Photochemotherapy: Mineral Oil And Broad Band Uvb: The patient received Photochemotherapy: Mineral Oil and Broad Band UVB.
Protocol For Photochemotherapy: Triamcinolone Ointment And Nbuvb: The patient received Photochemotherapy: Triamcinolone and NBUVB (triamcinolone ointment applied to all lesions prior to phototherapy).
Consent: Written consent obtained.  The risks were reviewed with the patient including but not limited to: burn, pigmentary changes, pain, blistering, scabbing, redness, increased risk of skin cancers, and the remote possibility of scarring.
Protocol For Photochemotherapy: Tar And Broad Band Uvb (Goeckerman Treatment): The patient received Photochemotherapy: Tar and Broad Band UVB (Goeckerman treatment).
Protocol For Uva: The patient received UVA.
Name Of Supervising Technician: Kavitha Cortez
Protocol: Photochemotherapy: Petrolatum and NBUVB
Protocol For Uva1: The patient received UVA1.
Protocol For Broad Band Uvb: The patient received Broad Band UVB.
Detail Level: Zone
Protocol For Nb Uva: The patient received NB UVA.
Protocol For Photochemotherapy For Severe Photoresponsive Dermatoses: Petrolatum And Broad Band Uvb: The patient received Photochemotherapyfor severe photoresponsive dermatoses: Petrolatum and Broad Band UVB requiring at least 4 to 8 hours of care under direct physician supervision.

## 2018-04-06 ENCOUNTER — APPOINTMENT (OUTPATIENT)
Age: 58
Setting detail: DERMATOLOGY
End: 2018-04-06

## 2018-04-06 DIAGNOSIS — L40.0 PSORIASIS VULGARIS: ICD-10-CM

## 2018-04-06 PROCEDURE — OTHER PHOTOTHERAPY TREATMENT: OTHER

## 2018-04-06 PROCEDURE — 96910 PHOTCHMTX TAR&UVB/PTRLTM&UVB: CPT

## 2018-04-06 ASSESSMENT — LOCATION DETAILED DESCRIPTION DERM
LOCATION DETAILED: INFERIOR THORACIC SPINE
LOCATION DETAILED: PERIUMBILICAL SKIN

## 2018-04-06 ASSESSMENT — LOCATION SIMPLE DESCRIPTION DERM
LOCATION SIMPLE: UPPER BACK
LOCATION SIMPLE: ABDOMEN

## 2018-04-06 ASSESSMENT — LOCATION ZONE DERM: LOCATION ZONE: TRUNK

## 2018-04-06 NOTE — PROCEDURE: PHOTOTHERAPY TREATMENT
Total Body Time: 1:30
Protocol For Photochemotherapy: Triamcinolone Ointment And Nbuvb: The patient received Photochemotherapy: Triamcinolone and NBUVB (triamcinolone ointment applied to all lesions prior to phototherapy).
Comments On Previous Treatment: No burning, itching, soreness, redness
Protocol For Uva: The patient received UVA.
Protocol For Photochemotherapy: Mineral Oil And Broad Band Uvb: The patient received Photochemotherapy: Mineral Oil and Broad Band UVB.
Protocol For Nb Uva: The patient received NB UVA.
Protocol For Broad Band Uvb: The patient received Broad Band UVB.
Protocol For Photochemotherapy: Petrolatum And Nbuvb: The patient received Photochemotherapy: Petrolatum and NBUVB (petrolatum applied to all lesions prior to phototherapy).
Post-Care Instructions: I reviewed with the patient in detail post-care instructions. Patient is to wear sun protection. Patients may expect sunburn like redness, discomfort and scabbing.
Treatment Number: 12
Changes In Treatment Protocol: Increased to 1:30
Protocol For Puva: The patient received PUVA.
Protocol For Protocol For Photochemotherapy For Severe Photoresponsive Dermatoses: Bath Puva: The patient received Photochemotherapy for severe photoresponsive dermatoses: Bath PUVA requiring at least 4 to 8 hours of care under direct physician supervision.
Protocol For Photochemotherapy For Severe Photoresponsive Dermatoses: Petrolatum And Broad Band Uvb: The patient received Photochemotherapyfor severe photoresponsive dermatoses: Petrolatum and Broad Band UVB requiring at least 4 to 8 hours of care under direct physician supervision.
Skin Type: II
Protocol For Photochemotherapy For Severe Photoresponsive Dermatoses: Tar And Broad Band Uvb (Goeckerman Treatment): The patient received Photochemotherapy for severe photoresponsive dermatoses: Tar and Broad Band UVB (Goeckerman treatment) requiring at least 4 to 8 hours of care under direct physician supervision.
Protocol For Photochemotherapy: Mineral Oil And Nbuvb: The patient received Photochemotherapy: Mineral Oil and NBUVB (mineral oil applied to all lesions prior to phototherapy).
Name Of Supervising Technician: Kavitha Cortez
Consent: Written consent obtained.  The risks were reviewed with the patient including but not limited to: burn, pigmentary changes, pain, blistering, scabbing, redness, increased risk of skin cancers, and the remote possibility of scarring.
Protocol For Photochemotherapy For Severe Photoresponsive Dermatoses: Tar And Nbuvb (Goeckerman Treatment): The patient received Photochemotherapy for severe photoresponsive dermatoses: Tar and NBUVB (Goeckerman treatment) requiring at least 4 to 8 hours of care under direct physician supervision.
Protocol For Nbuvb: The patient received NBUVB.
Protocol For Photochemotherapy: Tar And Nbuvb (Goeckerman Treatment): The patient received Photochemotherapy: Tar and NBUVB (Goeckerman treatment).
Protocol: Photochemotherapy: Petrolatum and NBUVB
Protocol For Photochemotherapy: Petrolatum And Broad Band Uvb: The patient received Photochemotherapy: Petrolatum and Broad Band UVB.
Protocol For Photochemotherapy: Tar And Broad Band Uvb (Goeckerman Treatment): The patient received Photochemotherapy: Tar and Broad Band UVB (Goeckerman treatment).
Protocol For Photochemotherapy For Severe Photoresponsive Dermatoses: Puva: The patient received Photochemotherapy for severe photoresponsive dermatoses: PUVA requiring at least 4 to 8 hours of care under direct physician supervision.
Protocol For Bath Puva: The patient received Bath PUVA.
Protocol For Photochemotherapy For Severe Photoresponsive Dermatoses: Petrolatum And Nbuvb: The patient received Photochemotherapy for severe photoresponsive dermatoses: Petrolatum and NBUVB requiring at least 4 to 8 hours of care under direct physician supervision.
Protocol For Uva1: The patient received UVA1.
Protocol For Photochemotherapy: Baby Oil And Nbuvb: The patient received Photochemotherapy: Baby Oil and NBUVB (baby oil applied to all lesions prior to phototherapy).
Detail Level: Zone

## 2018-04-09 ENCOUNTER — APPOINTMENT (OUTPATIENT)
Age: 58
Setting detail: DERMATOLOGY
End: 2018-04-09

## 2018-04-09 DIAGNOSIS — L40.0 PSORIASIS VULGARIS: ICD-10-CM

## 2018-04-09 PROCEDURE — 96910 PHOTCHMTX TAR&UVB/PTRLTM&UVB: CPT

## 2018-04-09 PROCEDURE — OTHER PHOTOTHERAPY TREATMENT: OTHER

## 2018-04-09 NOTE — PROCEDURE: PHOTOTHERAPY TREATMENT
Skin Type: II
Protocol For Bath Puva: The patient received Bath PUVA.
Protocol For Photochemotherapy For Severe Photoresponsive Dermatoses: Petrolatum And Nbuvb: The patient received Photochemotherapy for severe photoresponsive dermatoses: Petrolatum and NBUVB requiring at least 4 to 8 hours of care under direct physician supervision.
Post-Care Instructions: I reviewed with the patient in detail post-care instructions. Patient is to wear sun protection. Patients may expect sunburn like redness, discomfort and scabbing.
Protocol For Nbuvb: The patient received NBUVB.
Protocol For Photochemotherapy For Severe Photoresponsive Dermatoses: Tar And Broad Band Uvb (Goeckerman Treatment): The patient received Photochemotherapy for severe photoresponsive dermatoses: Tar and Broad Band UVB (Goeckerman treatment) requiring at least 4 to 8 hours of care under direct physician supervision.
Protocol For Uva1: The patient received UVA1.
Protocol For Photochemotherapy: Mineral Oil And Broad Band Uvb: The patient received Photochemotherapy: Mineral Oil and Broad Band UVB.
Protocol: Photochemotherapy: Petrolatum and NBUVB
Protocol For Photochemotherapy For Severe Photoresponsive Dermatoses: Petrolatum And Broad Band Uvb: The patient received Photochemotherapyfor severe photoresponsive dermatoses: Petrolatum and Broad Band UVB requiring at least 4 to 8 hours of care under direct physician supervision.
Protocol For Protocol For Photochemotherapy For Severe Photoresponsive Dermatoses: Bath Puva: The patient received Photochemotherapy for severe photoresponsive dermatoses: Bath PUVA requiring at least 4 to 8 hours of care under direct physician supervision.
Protocol For Nb Uva: The patient received NB UVA.
Protocol For Photochemotherapy For Severe Photoresponsive Dermatoses: Puva: The patient received Photochemotherapy for severe photoresponsive dermatoses: PUVA requiring at least 4 to 8 hours of care under direct physician supervision.
Protocol For Photochemotherapy: Petrolatum And Nbuvb: The patient received Photochemotherapy: Petrolatum and NBUVB (petrolatum applied to all lesions prior to phototherapy).
Protocol For Photochemotherapy: Tar And Broad Band Uvb (Goeckerman Treatment): The patient received Photochemotherapy: Tar and Broad Band UVB (Goeckerman treatment).
Protocol For Photochemotherapy: Baby Oil And Nbuvb: The patient received Photochemotherapy: Baby Oil and NBUVB (baby oil applied to all lesions prior to phototherapy).
Protocol For Photochemotherapy: Tar And Nbuvb (Goeckerman Treatment): The patient received Photochemotherapy: Tar and NBUVB (Goeckerman treatment).
Protocol For Broad Band Uvb: The patient received Broad Band UVB.
Protocol For Uva: The patient received UVA.
Protocol For Photochemotherapy: Triamcinolone Ointment And Nbuvb: The patient received Photochemotherapy: Triamcinolone and NBUVB (triamcinolone ointment applied to all lesions prior to phototherapy).
Treatment Number: 13
Protocol For Photochemotherapy: Petrolatum And Broad Band Uvb: The patient received Photochemotherapy: Petrolatum and Broad Band UVB.
Protocol For Photochemotherapy For Severe Photoresponsive Dermatoses: Tar And Nbuvb (Goeckerman Treatment): The patient received Photochemotherapy for severe photoresponsive dermatoses: Tar and NBUVB (Goeckerman treatment) requiring at least 4 to 8 hours of care under direct physician supervision.
Protocol For Puva: The patient received PUVA.
Total Body Time: 1:45
Consent: Written consent obtained.  The risks were reviewed with the patient including but not limited to: burn, pigmentary changes, pain, blistering, scabbing, redness, increased risk of skin cancers, and the remote possibility of scarring.
Protocol For Photochemotherapy: Mineral Oil And Nbuvb: The patient received Photochemotherapy: Mineral Oil and NBUVB (mineral oil applied to all lesions prior to phototherapy).
Detail Level: Generalized

## 2018-07-30 ENCOUNTER — APPOINTMENT (OUTPATIENT)
Age: 58
Setting detail: DERMATOLOGY
End: 2018-07-30

## 2018-07-30 DIAGNOSIS — L40.0 PSORIASIS VULGARIS: ICD-10-CM

## 2018-07-30 PROCEDURE — OTHER PRESCRIPTION: OTHER

## 2018-07-30 PROCEDURE — 99213 OFFICE O/P EST LOW 20 MIN: CPT

## 2018-07-30 RX ORDER — CLOBETASOL PROPIONATE 0.5 MG/G
CREAM TOPICAL BID
Qty: 1 | Refills: 1 | Status: ERX | COMMUNITY
Start: 2018-07-30

## 2018-07-30 RX ORDER — CALCITRIOL 3 UG/G
OINTMENT TOPICAL BID
Qty: 1 | Refills: 1 | Status: ERX | COMMUNITY
Start: 2018-07-30

## 2018-08-27 ENCOUNTER — APPOINTMENT (OUTPATIENT)
Age: 58
Setting detail: DERMATOLOGY
End: 2018-08-27

## 2018-08-27 DIAGNOSIS — L40.0 PSORIASIS VULGARIS: ICD-10-CM

## 2018-08-27 PROCEDURE — OTHER COUNSELING: OTHER

## 2018-08-27 PROCEDURE — OTHER PRESCRIPTION: OTHER

## 2018-08-27 PROCEDURE — 99213 OFFICE O/P EST LOW 20 MIN: CPT

## 2018-08-27 RX ORDER — CALCITRIOL 3 UG/G
OINTMENT TOPICAL BID
Qty: 1 | Refills: 2 | Status: ERX

## 2018-08-27 RX ORDER — TRIAMCINOLONE ACETONIDE 1 MG/G
CREAM TOPICAL BID
Qty: 1 | Refills: 3 | Status: ERX

## 2018-08-27 ASSESSMENT — LOCATION DETAILED DESCRIPTION DERM
LOCATION DETAILED: RIGHT ELBOW
LOCATION DETAILED: RIGHT PROXIMAL PRETIBIAL REGION
LOCATION DETAILED: LEFT PROXIMAL PRETIBIAL REGION
LOCATION DETAILED: LEFT PROXIMAL DORSAL FOREARM

## 2018-08-27 ASSESSMENT — LOCATION SIMPLE DESCRIPTION DERM
LOCATION SIMPLE: RIGHT ELBOW
LOCATION SIMPLE: LEFT PRETIBIAL REGION
LOCATION SIMPLE: RIGHT PRETIBIAL REGION
LOCATION SIMPLE: LEFT FOREARM

## 2018-08-27 ASSESSMENT — LOCATION ZONE DERM
LOCATION ZONE: LEG
LOCATION ZONE: ARM
